# Patient Record
Sex: MALE | Race: WHITE | NOT HISPANIC OR LATINO | Employment: UNEMPLOYED | URBAN - METROPOLITAN AREA
[De-identification: names, ages, dates, MRNs, and addresses within clinical notes are randomized per-mention and may not be internally consistent; named-entity substitution may affect disease eponyms.]

---

## 2017-01-23 ENCOUNTER — HOSPITAL ENCOUNTER (EMERGENCY)
Facility: MEDICAL CENTER | Age: 32
End: 2017-01-23
Attending: EMERGENCY MEDICINE

## 2017-01-23 VITALS
OXYGEN SATURATION: 99 % | TEMPERATURE: 97.8 F | HEART RATE: 89 BPM | DIASTOLIC BLOOD PRESSURE: 91 MMHG | RESPIRATION RATE: 18 BRPM | HEIGHT: 67 IN | SYSTOLIC BLOOD PRESSURE: 120 MMHG

## 2017-01-23 DIAGNOSIS — H16.202 KERATOCONJUNCTIVITIS, LEFT: ICD-10-CM

## 2017-01-23 PROCEDURE — 99284 EMERGENCY DEPT VISIT MOD MDM: CPT

## 2017-01-23 RX ORDER — HYDROCODONE BITARTRATE AND ACETAMINOPHEN 7.5; 325 MG/1; MG/1
1 TABLET ORAL EVERY 4 HOURS PRN
Qty: 10 TAB | Refills: 0 | Status: SHIPPED | OUTPATIENT
Start: 2017-01-23

## 2017-01-23 RX ORDER — POLYMYXIN B SULFATE AND TRIMETHOPRIM 1; 10000 MG/ML; [USP'U]/ML
2 SOLUTION OPHTHALMIC
Qty: 1 BOTTLE | Refills: 0 | Status: SHIPPED | OUTPATIENT
Start: 2017-01-23

## 2017-01-23 ASSESSMENT — PAIN SCALES - GENERAL: PAINLEVEL_OUTOF10: 9

## 2017-01-23 NOTE — ED PROVIDER NOTES
"ED Provider Note    CHIEF COMPLAINT  Chief Complaint   Patient presents with   • Eye Pain       HPI  Medardo Wang is a 31 y.o. male who presents for evaluation of eye pain.  The patient states over the last 2 days he developed redness along with photophobia and foreign body sensation to his left eye.  The patient thinks he possibly could've gotten some then does not absolutely sure.  He states that he has the sensation of something in his eye especially in the upper eyelid.  The patient denies a history of contact lens use.  No recent: Fever, URI symptoms, cardiorespiratory symptoms.  No other acute symptomatology or complaints.    REVIEW OF SYSTEMS  See HPI for further details.  No history of: Hypertension, diabetes, cardiopulmonary disorders, gastrointestinal disorder;    PAST MEDICAL HISTORY  None    FAMILY HISTORY  No family history on file.    SOCIAL HISTORY  Resides locally;    SURGICAL HISTORY  No past surgical history on file.    CURRENT MEDICATIONS  None    ALLERGIES  No Known Allergies    PHYSICAL EXAM  VITAL SIGNS: /91 mmHg  Pulse 89  Temp(Src) 36.6 °C (97.8 °F)  Resp 18  Ht 1.702 m (5' 7\")  SpO2 99%   Constitutional: Well developed, Well nourished, No acute distress, Non-toxic appearance.   HENT: Normocephalic, Atraumatic,  Eyes: PERRL, EOMI; conjunctiva is diffusely injected, the cornea reveals punctate keratopathy with pleurisy staining, anterior chambers deep and quiet, no foreign bodies identified with double lid eversion;  Neck: Normal range of motion, No tenderness, Supple, No stridor.   Neurologic: Alert & oriented x 3,  No gross focal deficits noted.         RADIOLOGY/PROCEDURES  1.  Slit lamp examination    COURSE & MEDICAL DECISION MAKING  Pertinent Labs & Imaging studies reviewed. (See chart for details)  Discussion: At this time, the patient has findings consistent with significant keratoconjunctivitis.  I see no foreign bodies at this time.  No definitive flare or cell " identified in the anterior chamber.  I did speak with ophthalmology on-call, Dr. Barrett.  She requested treatment with Polytrim and she will see the patient in her office at 4 PM today.  I discussed the findings treatment plan with patient.  The patient was discharged in stable condition.    FINAL IMPRESSION  1. Keratoconjunctivitis, left           PLAN  1.  Appropriate discharge instructions given   2.  Polytrim ophthalmic drops  3.  Lortab 7.5 mg #10  4.  Follow-up with Dr. Barrett    Electronically signed by: Guy G Gansert, 1/23/2017 2:26 PM

## 2017-01-23 NOTE — ED AVS SNAPSHOT
Tribesports Access Code: [email or web address]  Expires: 2/22/2017  3:11 PM    Tribesports  A secure, online tool to manage your health information     [employer] Tribesports® is a secure, online tool that connects you to your personalized health information from the privacy of your home -- day or night - making it very easy for you to manage your healthcare. Once the activation process is completed, you can even access your medical information using the Tribesports maciel, which is available for free in the Apple Maciel store or Google Play store.     Tribesports provides the following levels of access (as shown below):   My Chart Features   [facility] Primary Care Doctor [facility]  Specialists [facility]  Urgent  Care Non-[facility]  Primary Care  Doctor   Email your healthcare team securely and privately 24/7 X X X X   Manage appointments: schedule your next appointment; view details of past/upcoming appointments X      Request prescription refills. X      View recent personal medical records, including lab and immunizations X X X X   View health record, including health history, allergies, medications X X X X   Read reports about your outpatient visits, procedures, consult and ER notes X X X X   See your discharge summary, which is a recap of your hospital and/or ER visit that includes your diagnosis, lab results, and care plan. X X       How to register for Tribesports:  1. Go to  [email or web address].  2. Click on the Sign Up Now box, which takes you to the New Member Sign Up page. You will need to provide the following information:  a. Enter your Tribesports Access Code exactly as it appears at the top of this page. (You will not need to use this code after you’ve completed the sign-up process. If you do not sign up before the expiration date, you must request a new code.)   b. Enter your date of birth.   c. Enter your home email address.   d. Click Submit, and follow the next screen’s instructions.  3. Create a Tribesports ID. This will be your [employer]  login ID and cannot be changed, so think of one that is secure and easy to remember.  4. Create a HealthLoop password. You can change your password at any time.  5. Enter your Password Reset Question and Answer. This can be used at a later time if you forget your password.   6. Enter your e-mail address. This allows you to receive e-mail notifications when new information is available in HealthLoop.  7. Click Sign Up. You can now view your health information.    For assistance activating your HealthLoop account, call [phone]

## 2017-01-23 NOTE — ED NOTES
Pt provided with discharge instructions, prescriptions x2, instructions for follow up appointment with Ophthalmology today at 4 pm, s/s of when to seek emergency care.  Pt verbalizes understanding.  Pt discharged in good condition.  Pt instructed not to operate vehicle or drink ETOH on narcotic pain medication.

## 2017-01-23 NOTE — DISCHARGE INSTRUCTIONS
Keratoconjunctivitis Sicca  Keratoconjunctivitis sicca (dry eye) is dryness of the membranes surrounding the eye. It is caused by inadequate production of natural tears.  The eyes must remain lubricated at all times. This creates a smooth surface of the cornea (clear covering at the front of the eye), which allows the eyelids to slide over the eye without causing soreness and irritation. A small amount of tears are constantly produced by the tear glands (lacrimal glands). These glands are located under the outside part of the upper eyelids.  Dry eyes are often caused by decreased tear production. This condition is called aqueous tear-deficient dry eyes. The tear glands do not produce enough tears to keep the tissues surrounding the eye moist. This condition is common in postmenopausal women.  Dry eyes may also be caused by an abnormality of how the tears are made. This can result in faster evaporation of the tears. It is called evaporative dry eyes. Although the tear glands produce enough tears, the rate of evaporation is so fast that the entire eye surface cannot be kept covered with a complete layer of tears. The condition can occur during certain activities or in unusually dry surroundings.   Sometimes dry eyes are symptoms of other diseases that can affect the eyes. Some examples are:   · Rheumatoid arthritis.  · Systemic lupus erythematosus (lupus). Chronic inflammatory disease, causing the body to attack its own tissue.  · Sjogren syndrome. Dryness of the eyes and mouth, sometimes associated with a form of rheumatoid arthritis or lupus.  SYMPTOMS   Symptoms of dry eyes include:  · Irritation.  · Itching.  · Redness.  · Burning and feeling as though there is something gritty in the eye.  If the surface of the eye becomes damaged, sensitivity may increase, along with increased discomfort and sensitivity to bright light. Symptoms are made worse by activities with decreased blinking, such as staring at a  television, book, or computer screen.   Symptoms are also worse in daniel or smoky areas and in dry environments. Certain drugs can make symptoms worse, including antihistamines, tranquilizers, diuretics, antihypertensives (blood pressure medicine), and oral contraceptives. Symptoms tend to improve during cool, rainy, or foggy weather and in humid places, such as in the shower.  DIAGNOSIS   Dry eyes are usually diagnosed by symptoms alone. Sometimes a Schirmer test is done, in which a strip of filter paper is placed at the edge of the eyelid. The amount of moisture bathing the eye is measured. This test is done by measuring how far the tears go up a strip of paper applied to the eye in a set amount of time. Your caregiver will use a special microscope to examine the surface of the eye and the cornea to see if there are signs of dryness.  TREATMENT   Artificial tears (eye drops made with substances that simulate real tears) applied every few hours can generally control the problem. Lubricating ointments may help more severe cases. Avoiding dry, drafty environments and using humidifiers may also help. Minor surgery can be done to block the flow of tears into the nose, so that more tears are available to bathe the eyes. Patients with evaporative dry eyes may also benefit from treatment of the inflammation (redness and soreness) of the eyelids, which often occurs with the dry eyes. This treatment may include warm compresses, eyelid margin scrubs, or oral medicines.  PROGNOSIS   Even with severe dry eyes, it is uncommon to lose vision. At times, it may become difficult to see. Rarely, scarring and ulcers may occur, and blood vessels can grow across the cornea. Scarring and blood vessel growth can impair vision. In severe cases, the cornea may become damaged or infected. Ulcers or infections of the cornea are serious complications.  PREVENTION   There is no way to prevent getting keratoconjunctivitis sicca. However,  complications can be prevented by keeping the eyes as moist as possible with artificial drops, as needed.  SEEK IMMEDIATE MEDICAL CARE IF:   · Your eye pain gets worse.  · You develop a pus-like drainage from the eye.  · The eye drops or medicines prescribed by your caregiver are not helping.  · You have dry eyes and a sudden increase in discomfort or redness.  · You have a sudden decrease in vision.  · You have any other questions or concerns.     This information is not intended to replace advice given to you by your health care provider. Make sure you discuss any questions you have with your health care provider.     Document Released: 11/04/2005 Document Revised: 01/08/2016 Document Reviewed: 11/08/2010  [employer] Interactive Patient Education ©2016 [employer]

## 2017-01-23 NOTE — ED NOTES
32 y/o male bib wheelchair with c/o left eye pain. Pt states he thought he had an infection in his eye and was using his father's eye drops. Pt states the pain has increased and he feels now that there is something in the upper lid of his eye. Pt restless in triage, provided ice pack per request.

## 2017-01-23 NOTE — ED AVS SNAPSHOT
After Visit Summary                                                                                                                Medardo Wang   MRN: [id number]    Department:  [facility], Emergency Dept   Date of Visit:  1/23/2017            [facility], Emergency Dept    [address] [address]    Phone:  [phone]      You were seen by     Guy G Gansert, M.D.      Your Diagnosis Was     Keratoconjunctivitis, left     H16.202       Follow-up Information     1. Follow up with Keshia Barrett M.D..    Specialty:  Ophthalmology    Why:  1.  Follow-up with Dr. Barrett at 4 PM today;    Contact information    [address] [place] [id number]  [phone]        Medication Information     Review all of your home medications and newly ordered medications with your primary doctor and/or pharmacist as soon as possible. Follow medication instructions as directed by your doctor and/or pharmacist.     Please keep your complete medication list with you and share with your physician. Update the information when medications are discontinued, doses are changed, or new medications (including over-the-counter products) are added; and carry medication information at all times in the event of emergency situations.               Medication List      START taking these medications        Instructions    hydrocodone-acetaminophen 7.5-325 MG per tablet   Commonly known as:  NORCO    Take 1 Tab by mouth every four hours as needed (pain).   Dose:  1 Tab       polymixin-trimethoprim 48088-3.1 UNIT/ML-% Soln   Commonly known as:  POLYTRIM    Place 2 Drops in left eye every 3 hours while awake every 3 hours while awake.   Dose:  2 Drop               Procedures and tests performed during your visit     VISUAL ACUITY SCREENING        Discharge Instructions       Keratoconjunctivitis Sicca  Keratoconjunctivitis sicca (dry eye) is dryness of the membranes surrounding the eye. It is  caused by inadequate production of natural tears.  The eyes must remain lubricated at all times. This creates a smooth surface of the cornea (clear covering at the front of the eye), which allows the eyelids to slide over the eye without causing soreness and irritation. A small amount of tears are constantly produced by the tear glands (lacrimal glands). These glands are located under the outside part of the upper eyelids.  Dry eyes are often caused by decreased tear production. This condition is called aqueous tear-deficient dry eyes. The tear glands do not produce enough tears to keep the tissues surrounding the eye moist. This condition is common in postmenopausal women.  Dry eyes may also be caused by an abnormality of how the tears are made. This can result in faster evaporation of the tears. It is called evaporative dry eyes. Although the tear glands produce enough tears, the rate of evaporation is so fast that the entire eye surface cannot be kept covered with a complete layer of tears. The condition can occur during certain activities or in unusually dry surroundings.   Sometimes dry eyes are symptoms of other diseases that can affect the eyes. Some examples are:   · Rheumatoid arthritis.  · Systemic lupus erythematosus (lupus). Chronic inflammatory disease, causing the body to attack its own tissue.  · Sjogren syndrome. Dryness of the eyes and mouth, sometimes associated with a form of rheumatoid arthritis or lupus.  SYMPTOMS   Symptoms of dry eyes include:  · Irritation.  · Itching.  · Redness.  · Burning and feeling as though there is something gritty in the eye.  If the surface of the eye becomes damaged, sensitivity may increase, along with increased discomfort and sensitivity to bright light. Symptoms are made worse by activities with decreased blinking, such as staring at a television, book, or computer screen.   Symptoms are also worse in daniel or smoky areas and in dry environments. Certain drugs  can make symptoms worse, including antihistamines, tranquilizers, diuretics, antihypertensives (blood pressure medicine), and oral contraceptives. Symptoms tend to improve during cool, rainy, or foggy weather and in humid places, such as in the shower.  DIAGNOSIS   Dry eyes are usually diagnosed by symptoms alone. Sometimes a Schirmer test is done, in which a strip of filter paper is placed at the edge of the eyelid. The amount of moisture bathing the eye is measured. This test is done by measuring how far the tears go up a strip of paper applied to the eye in a set amount of time. Your caregiver will use a special microscope to examine the surface of the eye and the cornea to see if there are signs of dryness.  TREATMENT   Artificial tears (eye drops made with substances that simulate real tears) applied every few hours can generally control the problem. Lubricating ointments may help more severe cases. Avoiding dry, drafty environments and using humidifiers may also help. Minor surgery can be done to block the flow of tears into the nose, so that more tears are available to bathe the eyes. Patients with evaporative dry eyes may also benefit from treatment of the inflammation (redness and soreness) of the eyelids, which often occurs with the dry eyes. This treatment may include warm compresses, eyelid margin scrubs, or oral medicines.  PROGNOSIS   Even with severe dry eyes, it is uncommon to lose vision. At times, it may become difficult to see. Rarely, scarring and ulcers may occur, and blood vessels can grow across the cornea. Scarring and blood vessel growth can impair vision. In severe cases, the cornea may become damaged or infected. Ulcers or infections of the cornea are serious complications.  PREVENTION   There is no way to prevent getting keratoconjunctivitis sicca. However, complications can be prevented by keeping the eyes as moist as possible with artificial drops, as needed.  SEEK IMMEDIATE MEDICAL  CARE IF:   · Your eye pain gets worse.  · You develop a pus-like drainage from the eye.  · The eye drops or medicines prescribed by your caregiver are not helping.  · You have dry eyes and a sudden increase in discomfort or redness.  · You have a sudden decrease in vision.  · You have any other questions or concerns.     This information is not intended to replace advice given to you by your health care provider. Make sure you discuss any questions you have with your health care provider.     Document Released: 11/04/2005 Document Revised: 01/08/2016 Document Reviewed: 11/08/2010  [employer] Interactive Patient Education ©2016 [employer]            Patient Information     Patient Information    Following emergency treatment: all patient requiring follow-up care must return either to a private physician or a clinic if your condition worsens before you are able to obtain further medical attention, please return to the emergency room.     Billing Information    At [facility], we work to make the billing process streamlined for our patients.  Our Representatives are here to answer any questions you may have regarding your hospital bill.  If you have insurance coverage and have supplied your insurance information to us, we will submit a claim to your insurer on your behalf.  Should you have any questions regarding your bill, we can be reached online or by phone as follows:  Online: You are able pay your bills online or live chat with our representatives about any billing questions you may have. We are here to help Monday - Friday from 8:00am to 7:30pm and 9:00am - 12:00pm on Saturdays.  Please visit [email or web address]  for more information.   Phone:  [phone] or [phone]    Please note that your emergency physician, surgeon, pathologist, radiologist, anesthesiologist, and other specialists are not employed by [facility] and will therefore bill separately for their services.   Please contact them directly for any questions concerning their bills at the numbers below:     Emergency Physician Services:  [phone]  [facility]:  [phone]  [facility]:  [phone]  [facility]:  [phone]    1. Your final bill may vary from the amount quoted upon discharge if all procedures are not complete at that time, or if your doctor has additional procedures of which we are not aware. You will receive an additional bill if you return to the Emergency Department at [facility] for suture removal regardless of the facility of which the sutures were placed.     2. Please arrange for settlement of this account at the emergency registration.    3. All self-pay accounts are due in full at the time of treatment.  If you are unable to meet this obligation then payment is expected within 4-5 days.     4. If you have had radiology studies (CT, X-ray, Ultrasound, MRI), you have received a preliminary result during your emergency department visit. Please contact the radiology department [phone] to receive a copy of your final result. Please discuss the Final result with your primary physician or with the follow up physician provided.     Crisis Hotline:  [place] Crisis Hotline:  4-117-LNJOJNT or 1-505.535.8041  [place] Crisis Hotline:    1-616.874.6791 or 557-001-4565         ED Discharge Follow Up Questions    1. In order to provide you with very good care, we would like to follow up with a phone call in the next few days.  May we have your permission to contact you?     YES /  NO    2. What is the best phone number to call you? (       )_____-__________    3. What is the best time to call you?      Morning  /  Afternoon  /  Evening                   Patient Signature:  ____________________________________________________________    Date:  ____________________________________________________________

## 2017-01-23 NOTE — ED AVS SNAPSHOT
1/23/2017          Medardo Wang  [address] [address]    Dear Medardo:    [facility] wants to ensure your discharge home is safe and you or your loved ones have had all your questions answered regarding your care after you leave the hospital.    You may receive a telephone call within two days of your discharge.  This call is to make certain you understand your discharge instructions as well as ensure we provided you with the best care possible during your stay with us.     The call will only last approximately 3-5 minutes and will be done by a nurse.    Once again, we want to ensure your discharge home is safe and that you have a clear understanding of any next steps in your care.  If you have any questions or concerns, please do not hesitate to contact us, we are here for you.  Thank you for choosing [facility] for your healthcare needs.    Sincerely,    Connor Maldonado    [facility]